# Patient Record
Sex: FEMALE | Race: OTHER
[De-identification: names, ages, dates, MRNs, and addresses within clinical notes are randomized per-mention and may not be internally consistent; named-entity substitution may affect disease eponyms.]

---

## 2019-08-22 ENCOUNTER — HOSPITAL ENCOUNTER (OUTPATIENT)
Dept: HOSPITAL 62 - OD | Age: 29
End: 2019-08-22
Attending: FAMILY MEDICINE
Payer: OTHER GOVERNMENT

## 2019-08-22 DIAGNOSIS — Z98.84: Primary | ICD-10-CM

## 2019-08-22 LAB
ADD MANUAL DIFF: NO
ALBUMIN SERPL-MCNC: 4.3 G/DL (ref 3.5–5)
ALP SERPL-CCNC: 72 U/L (ref 38–126)
ANION GAP SERPL CALC-SCNC: 8 MMOL/L (ref 5–19)
AST SERPL-CCNC: 18 U/L (ref 14–36)
BASOPHILS # BLD AUTO: 0 10^3/UL (ref 0–0.2)
BASOPHILS NFR BLD AUTO: 1 % (ref 0–2)
BILIRUB DIRECT SERPL-MCNC: 0.3 MG/DL (ref 0–0.4)
BILIRUB SERPL-MCNC: 0.7 MG/DL (ref 0.2–1.3)
BUN SERPL-MCNC: 10 MG/DL (ref 7–20)
CALCIUM: 9.5 MG/DL (ref 8.4–10.2)
CHLORIDE SERPL-SCNC: 108 MMOL/L (ref 98–107)
CO2 SERPL-SCNC: 24 MMOL/L (ref 22–30)
EOSINOPHIL # BLD AUTO: 0.1 10^3/UL (ref 0–0.6)
EOSINOPHIL NFR BLD AUTO: 1.6 % (ref 0–6)
ERYTHROCYTE [DISTWIDTH] IN BLOOD BY AUTOMATED COUNT: 15.9 % (ref 11.5–14)
FERRITIN SERPL-MCNC: 4.63 NG/ML (ref 6.2–137)
GLUCOSE SERPL-MCNC: 84 MG/DL (ref 75–110)
HCT VFR BLD CALC: 32.2 % (ref 36–47)
HGB BLD-MCNC: 10.5 G/DL (ref 12–15.5)
IRON SERPL-MCNC: 26.5 UG/DL (ref 37–170)
LYMPHOCYTES # BLD AUTO: 1.3 10^3/UL (ref 0.5–4.7)
LYMPHOCYTES NFR BLD AUTO: 34.1 % (ref 13–45)
MCH RBC QN AUTO: 26.4 PG (ref 27–33.4)
MCHC RBC AUTO-ENTMCNC: 32.5 G/DL (ref 32–36)
MCV RBC AUTO: 81 FL (ref 80–97)
MONOCYTES # BLD AUTO: 0.3 10^3/UL (ref 0.1–1.4)
MONOCYTES NFR BLD AUTO: 8.6 % (ref 3–13)
NEUTROPHILS # BLD AUTO: 2.1 10^3/UL (ref 1.7–8.2)
NEUTS SEG NFR BLD AUTO: 54.7 % (ref 42–78)
PLATELET # BLD: 318 10^3/UL (ref 150–450)
POTASSIUM SERPL-SCNC: 4.1 MMOL/L (ref 3.6–5)
PROT SERPL-MCNC: 7.5 G/DL (ref 6.3–8.2)
RBC # BLD AUTO: 3.97 10^6/UL (ref 3.72–5.28)
TOTAL CELLS COUNTED % (AUTO): 100 %
WBC # BLD AUTO: 3.9 10^3/UL (ref 4–10.5)

## 2019-08-22 PROCEDURE — 80053 COMPREHEN METABOLIC PANEL: CPT

## 2019-08-22 PROCEDURE — 82607 VITAMIN B-12: CPT

## 2019-08-22 PROCEDURE — 36415 COLL VENOUS BLD VENIPUNCTURE: CPT

## 2019-08-22 PROCEDURE — 82525 ASSAY OF COPPER: CPT

## 2019-08-22 PROCEDURE — 82306 VITAMIN D 25 HYDROXY: CPT

## 2019-08-22 PROCEDURE — 84255 ASSAY OF SELENIUM: CPT

## 2019-08-22 PROCEDURE — 83540 ASSAY OF IRON: CPT

## 2019-08-22 PROCEDURE — 84630 ASSAY OF ZINC: CPT

## 2019-08-22 PROCEDURE — 82728 ASSAY OF FERRITIN: CPT

## 2019-08-22 PROCEDURE — 85025 COMPLETE CBC W/AUTO DIFF WBC: CPT

## 2019-10-23 ENCOUNTER — HOSPITAL ENCOUNTER (OUTPATIENT)
Dept: HOSPITAL 62 - OD | Age: 29
End: 2019-10-23
Attending: FAMILY MEDICINE
Payer: OTHER GOVERNMENT

## 2019-10-23 DIAGNOSIS — Z11.1: Primary | ICD-10-CM

## 2019-10-23 PROCEDURE — 36415 COLL VENOUS BLD VENIPUNCTURE: CPT

## 2019-10-23 PROCEDURE — 86480 TB TEST CELL IMMUN MEASURE: CPT

## 2020-03-24 ENCOUNTER — HOSPITAL ENCOUNTER (OUTPATIENT)
Dept: HOSPITAL 62 - WI | Age: 30
End: 2020-03-24
Attending: FAMILY MEDICINE
Payer: OTHER GOVERNMENT

## 2020-03-24 DIAGNOSIS — N64.4: Primary | ICD-10-CM

## 2020-03-24 PROCEDURE — 76642 ULTRASOUND BREAST LIMITED: CPT

## 2020-03-24 NOTE — WOMENS IMAGING REPORT
EXAM DESCRIPTION:  U/S BREAST UNILAT LIMITED



COMPLETED DATE/TIME:  3/24/2020 7:31 am



REASON FOR STUDY:  N64.4 MASTODYNIA (RIGHT); N64.4 MASTODYNIA (LEFT) N64.4  MASTODYNIA



COMPARISON:  None.



TECHNIQUE:  Real-time and static grayscale imaging performed of the right and left breast targeted to
 the area of clinical/mammographic concern. Selected color Doppler images recorded.



LIMITATIONS:  None.



FINDINGS:  MASS: No mass identified.  Normal glandular tissue.

OTHER: No other significant finding.



IMPRESSION:  No suspicious findings detected by ultrasound.



BIRAD:  Negative.



RECOMMENDATION:  RECOMMENDED FOLLOW-UP: Follow-up as clinically indicated.



COMMENT:  The American College of Radiology (ACR) has developed recommendations for screening MRI of 
the breasts in certain patient populations, to be used in conjunction with mammography.  Breast MRI s
urveillance may be appropriate for women with more than 20% lifetime risk of developing breast cancer
  as determined by genetic testing, significant family history of the disease, or history of mantle r
adiation for Hodgkins Disease.  ACR Practice Guidelines 2008.



TECHNICAL DOCUMENTATION:  JOB ID:  [id number]

 2011 [employer]- All Rights Reserved



Reading location - IP/workstation name: [email or web address]

## 2020-03-24 NOTE — WOMENS IMAGING REPORT
EXAM DESCRIPTION:  U/S BREAST UNILAT LIMITED



COMPLETED DATE/TIME:  3/24/2020 7:31 am



REASON FOR STUDY:  N64.4 MASTODYNIA (RIGHT); N64.4 MASTODYNIA (LEFT) N64.4  MASTODYNIA



COMPARISON:  None.



TECHNIQUE:  Real-time and static grayscale imaging performed of the right and left breast targeted to
 the area of clinical/mammographic concern. Selected color Doppler images recorded.



LIMITATIONS:  None.



FINDINGS:  MASS: No mass identified.  Normal glandular tissue.

OTHER: No other significant finding.



IMPRESSION:  No suspicious findings detected by ultrasound.



BIRAD:  Negative.



RECOMMENDATION:  RECOMMENDED FOLLOW-UP: Follow-up as clinically indicated.



COMMENT:  The American College of Radiology (ACR) has developed recommendations for screening MRI of 
the breasts in certain patient populations, to be used in conjunction with mammography.  Breast MRI s
urveillance may be appropriate for women with more than 20% lifetime risk of developing breast cancer
  as determined by genetic testing, significant family history of the disease, or history of mantle r
adiation for Hodgkins Disease.  ACR Practice Guidelines 2008.



TECHNICAL DOCUMENTATION:  JOB ID:  [id number]

 2011 [employer]- All Rights Reserved



Reading location - IP/workstation name: [email or web address]

## 2020-09-17 ENCOUNTER — HOSPITAL ENCOUNTER (EMERGENCY)
Dept: HOSPITAL 62 - ER | Age: 30
LOS: 1 days | Discharge: HOME | End: 2020-09-18
Payer: OTHER GOVERNMENT

## 2020-09-17 DIAGNOSIS — R10.9: ICD-10-CM

## 2020-09-17 DIAGNOSIS — R10.2: ICD-10-CM

## 2020-09-17 DIAGNOSIS — N83.201: ICD-10-CM

## 2020-09-17 DIAGNOSIS — Z20.2: ICD-10-CM

## 2020-09-17 DIAGNOSIS — R50.9: ICD-10-CM

## 2020-09-17 DIAGNOSIS — N39.0: ICD-10-CM

## 2020-09-17 DIAGNOSIS — R11.0: ICD-10-CM

## 2020-09-17 DIAGNOSIS — N12: Primary | ICD-10-CM

## 2020-09-17 DIAGNOSIS — J45.909: ICD-10-CM

## 2020-09-17 LAB
ADD MANUAL DIFF: NO
ALBUMIN SERPL-MCNC: 3.9 G/DL (ref 3.5–5)
ALP SERPL-CCNC: 94 U/L (ref 38–126)
ANION GAP SERPL CALC-SCNC: 10 MMOL/L (ref 5–19)
APPEARANCE UR: (no result)
APTT PPP: (no result) S
AST SERPL-CCNC: 31 U/L (ref 14–36)
BASOPHILS # BLD AUTO: 0 10^3/UL (ref 0–0.2)
BASOPHILS NFR BLD AUTO: 0.6 % (ref 0–2)
BILIRUB DIRECT SERPL-MCNC: 0.2 MG/DL (ref 0–0.4)
BILIRUB SERPL-MCNC: 0.6 MG/DL (ref 0.2–1.3)
BILIRUB UR QL STRIP: NEGATIVE
BUN SERPL-MCNC: 6 MG/DL (ref 7–20)
CALCIUM: 9.1 MG/DL (ref 8.4–10.2)
CHLORIDE SERPL-SCNC: 109 MMOL/L (ref 98–107)
CO2 SERPL-SCNC: 19 MMOL/L (ref 22–30)
EOSINOPHIL # BLD AUTO: 0 10^3/UL (ref 0–0.6)
EOSINOPHIL NFR BLD AUTO: 0.3 % (ref 0–6)
ERYTHROCYTE [DISTWIDTH] IN BLOOD BY AUTOMATED COUNT: 19.3 % (ref 11.5–14)
GLUCOSE SERPL-MCNC: 96 MG/DL (ref 75–110)
GLUCOSE UR STRIP-MCNC: NEGATIVE MG/DL
HCT VFR BLD CALC: 31.8 % (ref 36–47)
HGB BLD-MCNC: 10.3 G/DL (ref 12–15.5)
KETONES UR STRIP-MCNC: NEGATIVE MG/DL
LYMPHOCYTES # BLD AUTO: 1.1 10^3/UL (ref 0.5–4.7)
LYMPHOCYTES NFR BLD AUTO: 15.9 % (ref 13–45)
MCH RBC QN AUTO: 24.9 PG (ref 27–33.4)
MCHC RBC AUTO-ENTMCNC: 32.3 G/DL (ref 32–36)
MCV RBC AUTO: 77 FL (ref 80–97)
MONOCYTES # BLD AUTO: 0.7 10^3/UL (ref 0.1–1.4)
MONOCYTES NFR BLD AUTO: 10.5 % (ref 3–13)
NEUTROPHILS # BLD AUTO: 5.1 10^3/UL (ref 1.7–8.2)
NEUTS SEG NFR BLD AUTO: 72.7 % (ref 42–78)
NITRITE UR QL STRIP: POSITIVE
PH UR STRIP: 5 [PH] (ref 5–9)
PLATELET # BLD: 317 10^3/UL (ref 150–450)
POTASSIUM SERPL-SCNC: 3.7 MMOL/L (ref 3.6–5)
PROT SERPL-MCNC: 7 G/DL (ref 6.3–8.2)
PROT UR STRIP-MCNC: 100 MG/DL
RBC # BLD AUTO: 4.12 10^6/UL (ref 3.72–5.28)
SP GR UR STRIP: 1.02
TOTAL CELLS COUNTED % (AUTO): 100 %
UROBILINOGEN UR-MCNC: 2 MG/DL (ref ?–2)
WBC # BLD AUTO: 7 10^3/UL (ref 4–10.5)

## 2020-09-17 PROCEDURE — 85025 COMPLETE CBC W/AUTO DIFF WBC: CPT

## 2020-09-17 PROCEDURE — 83690 ASSAY OF LIPASE: CPT

## 2020-09-17 PROCEDURE — 80053 COMPREHEN METABOLIC PANEL: CPT

## 2020-09-17 PROCEDURE — 87088 URINE BACTERIA CULTURE: CPT

## 2020-09-17 PROCEDURE — 87186 SC STD MICRODIL/AGAR DIL: CPT

## 2020-09-17 PROCEDURE — 84703 CHORIONIC GONADOTROPIN ASSAY: CPT

## 2020-09-17 PROCEDURE — 81001 URINALYSIS AUTO W/SCOPE: CPT

## 2020-09-17 PROCEDURE — 76830 TRANSVAGINAL US NON-OB: CPT

## 2020-09-17 PROCEDURE — 87086 URINE CULTURE/COLONY COUNT: CPT

## 2020-09-17 PROCEDURE — 99285 EMERGENCY DEPT VISIT HI MDM: CPT

## 2020-09-17 PROCEDURE — 36415 COLL VENOUS BLD VENIPUNCTURE: CPT

## 2020-09-17 PROCEDURE — 74176 CT ABD & PELVIS W/O CONTRAST: CPT

## 2020-09-17 PROCEDURE — 96375 TX/PRO/DX INJ NEW DRUG ADDON: CPT

## 2020-09-17 PROCEDURE — 96365 THER/PROPH/DIAG IV INF INIT: CPT

## 2020-09-17 NOTE — RADIOLOGY REPORT (SQ)
EXAM DESCRIPTION: 



US PELVIS TRANSVAGINAL



COMPLETED DATE/TME:  09/17/2020 19:31



CLINICAL HISTORY: 



29 years, Female, Lower abdominal/pelvic pain



COMPARISON:

None.



TECHNIQUE:

Emergent pelvic ultrasound



LIMITATIONS:

None.



FINDINGS:



The uterus measures 8.6 x 4.7 x 3.8 cm. Endometrium measures 3 mm

in thickness. Myometrium is homogenous. The right ovary measures

6 x 4 x 4 cm, the left 2 x 2 by 2 cm. Doppler and spectral

analysis with color flow shows arterial and venous flow to each

ovary. There is a 5.3 x 3.9 x 3.6 cm right ovarian cyst. This has

a peripheral septation and internal low-level echoes. No solid

adnexal mass. Trace of free fluid.



IMPRESSION:



Slightly complex right ovarian cyst likely reflects hemorrhagic

cyst. Recommend follow-up in 6-12 weeks, as per below.



Recommendations for f/u of ovarian complex cysts (1):



Endometrioma:

<= 7 cm: US f/u 6-12 wks.

  If not surgically resected, US f/u annually.

>7 cm: Consider MR w/IVC or surgical evaluation. 

  If not surgically resected, US f/u annually.



Dermoid:

<= 5 cm: MR w/IV contrast. 

  If not surgically resected, US f/u annually.

>5 cm: Surgical evaluation. 

  If not surgically resected, MR w/IVC; then US f/u annually



Indeterminate cyst - multiple thin <=3 mm septations:

Any size in any age: Consider surgical evaluation.



Indeterminate cyst - non-hyperechoic nodule w/o blood flow:

Any size in any age: Consider MR w/IVC or surgical evaluation.



Indeterminate cyst - other, not classic for but suggestive of

  hemorrhagic cyst, endometrioma or dermoid:

Pre-menopause:

<= 7 cm: US f/u 6-12 weeks. If unchanged, continue f/u with US or



  consider MR w/IVC. If these do not confirm endometrioma or 

  dermoid, consider surgical evaluation. 

>7 cm: Consider MR w/IVC or surgical evaluation.



Post-menopause (>=1 year from last menstrual period):

Any size: Consider surgical evaluation.



Cyst worrisome for malignancy (thick, irregular >=3 mm  

  septations or nodule with blood flow):

Any size in any age: Consider surgical evaluation.



________________________________________________________________

(1) Recommendations based upon the 2010 SRU Consensus 

Conference Statement on the Management of Asymptomatic

Ovarian and Other Adnexal Cysts Imaged at US:  

Radiology. 2010 Sep;256(3):998-40

 



copyright 2011 [employer]- All Rights Reserved

## 2020-09-17 NOTE — ER DOCUMENT REPORT
ED Medical Screen (RME)





- General


Chief Complaint: Abdominal Pain


Stated Complaint: BACK AND ABDOMINALPAIN


Time Seen by Provider: 09/17/20 19:22


Primary Care Provider: 


LIAN NESBITT MD [Primary Care Provider] - Follow up as needed


Mode of Arrival: Ambulatory


Information source: Patient


Notes: 





29-year-old female presents to ED for complaint of body aches lower ab

dominal/pelvic pain pain to both hands and feet are numb and tingling.  She 

states she also has urinary frequency urgency times a week.  She states she has 

intermittent nausea with the medicine she was taking for urinary tract 

infection.  She states her  recently tested positive for gonorrhea so she

went to the health department and they tested her for gonorrhea.  She states 

that results will not be back for 2 weeks.  I have ordered blood urine and swabs

for wet mount and gonorrhea.  She does have pelvic pain so she will need a 

pelvic exam.  I have also ordered a pelvic ultrasound.

















I have greeted and performed a rapid initial assessment of this patient.  A 

comprehensive ED assessment and evaluation of the patient, analysis of test 

results and completion of medical decision making process will be conducted by 

an additional ED providers.


TRAVEL OUTSIDE OF THE [place] IN LAST 30 DAYS: No





- Related Data


Allergies/Adverse Reactions: 


                                        





No Known Allergies Allergy (Verified 04/07/13 23:15)


   











Past Medical History


Pulmonary Medical History: Reports: Hx Asthma


Neurological Medical History: Reports: Hx Seizures


GI Medical History: Reports: Hx Gastroesophageal Reflux Disease


Skin Medical History: Reports Hx Cellulitis


Past Surgical History: Reports: Hx Cholecystectomy - 2010





- Immunizations


Immunizations up to date: Yes


Hx Diphtheria, Pertussis, Tetanus Vaccination: Yes





Physical Exam





- Vital signs


Vitals: 





                                        











Temp Pulse Resp BP Pulse Ox


 


 101.6 F H  108 H  20   118/68   100 


 


 09/17/20 19:31  09/17/20 19:31  09/17/20 19:31  09/17/20 19:31  09/17/20 19:31














Course





- Vital Signs


Vital signs: 





                                        











Temp Pulse Resp BP Pulse Ox


 


 101.6 F H  108 H  20   118/68   100 


 


 09/17/20 19:31  09/17/20 19:31  09/17/20 19:31  09/17/20 19:31  09/17/20 19:31














Doctor's Discharge





- Discharge


Referrals: 


NESBITT,SEQUIA A, MD [Primary Care Provider] - Follow up as needed

## 2020-09-17 NOTE — RADIOLOGY REPORT (SQ)
EXAM DESCRIPTION: 





CT ABDOMEN PELVIS WITHOUT IV CONTRAST

COMPLETED DATE/TME:  09/17/2020 22:26



CLINICAL HISTORY:  29 years  Female  flank pain right sided



COMPARISON:  None.



TECHNIQUE: Contiguous axial images obtained through the abdomen

and pelvis without IV contrast. Reformatted images obtained.  



This exam was performed according to our department optimization

program which includes automated exposure control, adjustment of

the mA and/or kv according to patient size and/or use of

iterative reconstruction technique.



FINDINGS:



The lung bases are clear.



The liver appears unremarkable.

The spleen and pancreas appear unremarkable.

No adrenal masses.



The kidneys appear unremarkable. No hydronephrosis or definite

ureteral calculi.



The gallbladder is absent.



No aneurysmal dilatation of the aorta.



No bowel obstruction.  Unremarkable appendix.



Hemorrhagic appearing right ovarian cyst measuring 3.8 x 3.9 cm.

This was seen on ultrasound examination. Recommend follow-up

ultrasound in 6-12 weeks.



IMPRESSION:



Hemorrhagic appearing right ovarian cyst measuring 3.8 x 3.9 cm.

This has been evaluated on ultrasound earlier the same day.

Please follow the recommendation based on the ultrasound

appearance.

## 2020-09-17 NOTE — ER DOCUMENT REPORT
ED GI/





- General


Chief Complaint: Abdominal Pain


Stated Complaint: BACK AND ABDOMINALPAIN


Time Seen by Provider: 09/17/20 19:22


Primary Care Provider: 


LIAN NESBITT MD [Primary Care Provider] - Follow up as needed


CLAUDETTE CRAFT MD [ACTIVE STAFF] - Follow up in 3-5 days


Mode of Arrival: Ambulatory


TRAVEL OUTSIDE OF THE [place] IN LAST 30 DAYS: No





- HPI


Patient complains to provider of: Pelvic pain.  No: Abdominal pain, Diarrhea, 

Hematuria, Pregnant, Vaginal bleeding, Vaginal discharge


Onset: Other - Several days


Timing/Duration: Gradual


Quality of pain: Pressure


Context: denies: Bad food, Lifting, Pregnant


Location: Pelvis.  No: Chest pain


Vaginal bleeding (Compared to normal period): None


Notes: 





09/17/20 22:44


Patient is a 29-year-old female who presents with right flank pain and pelvic 

pressure.  Patient states it has been present for several days.  She has not b

een febrile at home.  She denies any chills.  Patient denies dysuria or 

hematuria.  She denies any vaginal discharge.  Patient went to the health 

department today to get tested for STDs as her  was diagnosed with 

gonorrhea last week.  Patient is denying any vaginal discharge.  She states that

they did a pelvic exam and did not see any significant discharge.  Patient does 

not want another pelvic exam today in the ER.  Patient has been taking Azo for 

the urinary pressure as she has had UTIs before.  She is not currently on any 

antibiotics.





- Related Data


Allergies/Adverse Reactions: 


                                        





No Known Allergies Allergy (Verified 04/07/13 23:15)


   











Past Medical History





- General


Information source: Patient





- Social History


Smoking Status: Never Smoker


Family History: Reviewed & Not Pertinent


Pulmonary Medical History: Reports: Hx Asthma


Neurological Medical History: Reports: Hx Seizures


GI Medical History: Reports: Hx Gastroesophageal Reflux Disease


Skin Medical History: Reports Hx Cellulitis


Past Surgical History: Reports: Hx Cholecystectomy - 2010





- Immunizations


Immunizations up to date: Yes


Hx Diphtheria, Pertussis, Tetanus Vaccination: Yes





Review of Systems





- Review of Systems


Notes: 





CONSTITUTIONAL:  No fever, fatigue or weight loss.  


SKIN:  No rash.  


HENT:  No congestion, ear pain, or sore throat.  


EYES:  No recent vision problems or eye pain.  


ENDOCRINE:  No polyuria or polydipsia. 


CARDIOVASCULAR:  No chest pain or edema.


RESPIRATORY:  No cough, shortness of breath, congestion, or wheezing.  


GASTROINTESTINAL: Mild nausea.  Lower abdominal suprapubic discomfort.


GENITOURINARY: No dysuria. Positive for urinary pressure.


MUSCULOSKELETAL:  No joint pain or swelling.  Right flank discomfort.


NEUROLOGIC:  No seizures. No headache, focal weakness or sensory changes. 


HEMATOLOGIC:  No unusual bruising or bleeding.  


PSYCHIATRIC:  No depression or anxiety.





Physical Exam





- Vital signs


Vitals: 


                                        











Temp Pulse Resp BP Pulse Ox


 


 101.6 F H  108 H  20   118/68   100 


 


 09/17/20 19:31  09/17/20 19:31  09/17/20 19:31  09/17/20 19:31  09/17/20 19:31











Interpretation: Febrile





- Notes


Notes: 





VITAL SIGNS: Febrile


GENERAL:  No acute distress, non-toxic appearance.  


HEAD:  Normal with no signs of head trauma.


EYES:  EOMI, conjunctiva normal, no discharge.  


EARS:  Hearing grossly intact.


NOSE: Normal.


NECK:  Normal range of motion, no tenderness, supple, no lymphadenopathy, No 

adenopathy, no JVD.   


CHEST:  Clear breath sounds bilaterally.  No wheezes, rales, or rhonchi.  


CARDIAC:  Regular rate and rhythm.  S1 and S2, without murmurs, gallops, or 

rubs.


VASCULAR:  No Edema. 


ABDOMEN: Normal and soft with no tenderness


GENITOURINARY: Discomfort to suprapubic palpation.


LYMPATHTIC:  No lymphadenopathy noted.


MUSCULOSKELETAL:  Good range of motion of all major joints. Extremities without 

clubbing, cyanosis or edema.  Discomfort to palpation of right flank.  No 

rashes.


NEUROLOGICAL:  Alert and oriented x 3.  No focal sensory or strength deficits.  

Speech normal.  Follows commands appropriately.


PSYCHIATRIC:  Normal Affect, judgement and mood.


SKIN:  Normal appearance with no rashes or lesions.





Course





- Re-evaluation


Re-evalutation: 





09/18/20 02:41


Patient did have a fever initially.  This has resolved.  She appears well on 

exam.  Ultrasound shows an ovarian cyst on the right.  I did discuss this with 

her.  She will need to follow-up with OB for repeat ultrasound in approximately 

6 weeks.  She was given the number for OB.  I obtained a CT scan to rule out a 

kidney stone as she had some right flank pain and this was negative.  Patient's 

urine is suspicious for UTI.  I did order blood cultures and urine cultures.  We

will treat with Rocephin.  Patient states she has had a UTI in the past and this

feels very similar.  Patient does mention that her  had gonorrhea 

diagnosed recently.  She did go to the health department today to get tested but

was not aware of her 's positive diagnosis until later after the 

appointment.  Patient had a pelvic exam done there and she stated there was no 

discharge.  Patient has not yet been treated for this.  She does not want a 

pelvic exam in the ER today and she does not want repeat STD testing as she just

had that done.  I believe that her symptoms are likely from a UTI and possible 

pyelonephritis rather than PID as she has positive nitrite in her urine.  She a

lso states that she has had this pain before with previous UTIs.  I did offer 

treatment for STDs and she agreed.  Patient was also given azithromycin and 

Flagyl in addition to the rocephin.  She will be sent home with Keflex for the 

UTI.  Patient was given strict return precautions including fevers, chills, 

vomiting, any other symptoms.  She was instructed to follow-up with her OB for 

reevaluation as well as the ultrasound in 6 weeks to evaluate the cyst.  Patient

was very agreeable to the plan for follow-up and will return for any concerning 

symptoms.





- Vital Signs


Vital signs: 


                                        











Temp Pulse Resp BP Pulse Ox


 


 98.8 F   89   20   114/59 L  100 


 


 09/18/20 01:53  09/18/20 01:53  09/18/20 01:53  09/18/20 00:48  09/18/20 01:53














- Laboratory


Result Diagrams: 


                                 09/17/20 21:31





                                 09/17/20 21:31


Laboratory results interpreted by me: 


                                        











  09/17/20 09/17/20 09/17/20





  21:31 21:31 21:40


 


Hgb  10.3 L  


 


Hct  31.8 L  


 


MCV  77 L  


 


MCH  24.9 L  


 


RDW  19.3 H  


 


Chloride   109 H 


 


Carbon Dioxide   19 L 


 


BUN   6 L 


 


Urine Protein    100 H


 


Urine Blood    SMALL H


 


Urine Nitrite    POSITIVE H


 


Urine Urobilinogen    2.0 H


 


Ur Leukocyte Esterase    LARGE H














Discharge





- Discharge


Clinical Impression: 


 Pyelonephritis





Urinary tract infection


Qualifiers:


 Urinary tract infection type: site unspecified Hematuria presence: without 

hematuria Qualified Code(s): N39.0 - Urinary tract infection, site not specified





Ovarian cyst


Qualifiers:


 Laterality: right Qualified Code(s): N83.201 - Unspecified ovarian cyst, right 

side





Condition: Stable


Disposition: HOME, SELF-CARE


Instructions:  Abdominal Pain ([facility]), Pyelonephritis ([facility]), Urinary Tract 

Infection ([facility])


Additional Instructions: 


Please follow-up with your family doctor.  You must also follow-up with OBGYN.  

I provided the number for them.  It is recommended that you get a repeat 

ultrasound of the ovarian cyst.  Please return to the ED for any fevers, pain, 

nausea, vomiting.


Prescriptions: 


Cephalexin Monohydrate [Keflex 500 mg Capsule] 500 mg PO BID #14 capsule


Ondansetron [Zofran Odt 4 mg Tablet] 4 mg PO Q6H #10 tab.rapdis


Referrals: 


LIAN NESBITT MD [Primary Care Provider] - Follow up as needed


CLAUDETTE CRAFT MD [ACTIVE STAFF] - Follow up in 3-5 days

## 2020-09-18 VITALS — SYSTOLIC BLOOD PRESSURE: 114 MMHG | DIASTOLIC BLOOD PRESSURE: 59 MMHG

## 2020-09-18 NOTE — ER DOCUMENT REPORT
ED GI/





- General


Chief Complaint: Abdominal Pain


Stated Complaint: BACK AND ABDOMINALPAIN


Time Seen by Provider: 09/17/20 19:22


Primary Care Provider: 


LIAN NESBITT MD [Primary Care Provider] - Follow up as needed


CLAUDETTE CRAFT MD [ACTIVE STAFF] - Follow up in 3-5 days


Mode of Arrival: Ambulatory


TRAVEL OUTSIDE OF THE [place] IN LAST 30 DAYS: No





- Related Data


Allergies/Adverse Reactions: 


                                        





No Known Allergies Allergy (Verified 04/07/13 23:15)


   











Past Medical History





- General


Information source: Patient





- Social History


Smoking Status: Never Smoker


Family History: Reviewed & Not Pertinent


Pulmonary Medical History: Reports: Hx Asthma


Neurological Medical History: Reports: Hx Seizures


GI Medical History: Reports: Hx Gastroesophageal Reflux Disease


Skin Medical History: Reports Hx Cellulitis


Past Surgical History: Reports: Hx Cholecystectomy - 2010





- Immunizations


Immunizations up to date: Yes


Hx Diphtheria, Pertussis, Tetanus Vaccination: Yes





Physical Exam





- Vital signs


Vitals: 


                                        











Temp Pulse Resp BP Pulse Ox


 


 101.6 F H  108 H  20   118/68   100 


 


 09/17/20 19:31  09/17/20 19:31  09/17/20 19:31  09/17/20 19:31  09/17/20 19:31














Course





- Vital Signs


Vital signs: 


                                        











Temp Pulse Resp BP Pulse Ox


 


 98.8 F   89   20   114/59 L  100 


 


 09/18/20 01:53  09/18/20 01:53  09/18/20 01:53  09/18/20 00:48  09/18/20 01:53














- Laboratory


Result Diagrams: 


                                 09/17/20 21:31





                                 09/17/20 21:31


Laboratory results interpreted by me: 


                                        











  09/17/20 09/17/20 09/17/20





  21:31 21:31 21:40


 


Hgb  10.3 L  


 


Hct  31.8 L  


 


MCV  77 L  


 


MCH  24.9 L  


 


RDW  19.3 H  


 


Chloride   109 H 


 


Carbon Dioxide   19 L 


 


BUN   6 L 


 


Urine Protein    100 H


 


Urine Blood    SMALL H


 


Urine Nitrite    POSITIVE H


 


Urine Urobilinogen    2.0 H


 


Ur Leukocyte Esterase    LARGE H














Discharge





- Discharge


Clinical Impression: 


 Pyelonephritis





Urinary tract infection


Qualifiers:


 Urinary tract infection type: site unspecified Hematuria presence: without 

hematuria Qualified Code(s): N39.0 - Urinary tract infection, site not specified





Ovarian cyst


Qualifiers:


 Laterality: right Qualified Code(s): N83.201 - Unspecified ovarian cyst, right 

side





Condition: Stable


Disposition: HOME, SELF-CARE


Instructions:  Abdominal Pain ([facility]), Pyelonephritis ([facility]), Urinary Tract 

Infection ([facility])


Additional Instructions: 


Please follow-up with your family doctor.  You must also follow-up with OBGYN.  

I provided the number for them.  It is recommended that you get a repeat 

ultrasound of the ovarian cyst.  Please return to the ED for any fevers, pain, 

nausea, vomiting.


Prescriptions: 


Azithromycin 1,000 mg PO ONCE PRN #2 tablet


 PRN Reason: 


Metronidazole [Flagyl 500 mg Tablet] 2,000 mg PO ONCE PRN #4 tablet


 PRN Reason: 


Cephalexin Monohydrate [Keflex 500 mg Capsule] 500 mg PO BID #14 capsule


Ondansetron [Zofran Odt 4 mg Tablet] 4 mg PO Q6H #10 tab.rapdis


Referrals: 


LIAN NESBITT MD [Primary Care Provider] - Follow up as needed


CLAUDETTE CRAFT MD [ACTIVE STAFF] - Follow up in 3-5 days

## 2020-12-01 ENCOUNTER — HOSPITAL ENCOUNTER (EMERGENCY)
Dept: HOSPITAL 62 - ER | Age: 30
Discharge: HOME | End: 2020-12-01
Payer: COMMERCIAL

## 2020-12-01 VITALS — DIASTOLIC BLOOD PRESSURE: 68 MMHG | SYSTOLIC BLOOD PRESSURE: 109 MMHG

## 2020-12-01 DIAGNOSIS — S16.1XXA: Primary | ICD-10-CM

## 2020-12-01 DIAGNOSIS — K21.9: ICD-10-CM

## 2020-12-01 DIAGNOSIS — J45.909: ICD-10-CM

## 2020-12-01 DIAGNOSIS — Z79.899: ICD-10-CM
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
## 2020-12-01 DIAGNOSIS — S29.012A: ICD-10-CM

## 2020-12-01 PROCEDURE — 99283 EMERGENCY DEPT VISIT LOW MDM: CPT
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
## 2021-01-22 ENCOUNTER — HOSPITAL ENCOUNTER (EMERGENCY)
Dept: HOSPITAL 62 - ER | Age: 31
Discharge: HOME | End: 2021-01-22
Payer: COMMERCIAL

## 2021-01-22 VITALS — SYSTOLIC BLOOD PRESSURE: 108 MMHG | DIASTOLIC BLOOD PRESSURE: 68 MMHG

## 2021-01-22 DIAGNOSIS — M54.5: ICD-10-CM

## 2021-01-22 DIAGNOSIS — S29.019A: Primary | ICD-10-CM
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
## 2021-01-22 LAB
APPEARANCE UR: CLEAR
APTT PPP: YELLOW S
BILIRUB UR QL STRIP: NEGATIVE
GLUCOSE UR STRIP-MCNC: NEGATIVE MG/DL
KETONES UR STRIP-MCNC: NEGATIVE MG/DL
NITRITE UR QL STRIP: NEGATIVE
PH UR STRIP: 6 [PH] (ref 5–9)
PROT UR STRIP-MCNC: NEGATIVE MG/DL
SP GR UR STRIP: 1.02
UROBILINOGEN UR-MCNC: 4 MG/DL (ref ?–2)

## 2021-01-22 PROCEDURE — 99284 EMERGENCY DEPT VISIT MOD MDM: CPT

## 2021-01-22 PROCEDURE — 81001 URINALYSIS AUTO W/SCOPE: CPT

## 2021-01-22 PROCEDURE — 72110 X-RAY EXAM L-2 SPINE 4/>VWS: CPT

## 2021-01-22 PROCEDURE — 72070 X-RAY EXAM THORAC SPINE 2VWS: CPT

## 2021-01-22 PROCEDURE — 81025 URINE PREGNANCY TEST: CPT
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
